# Patient Record
Sex: FEMALE | Race: OTHER | HISPANIC OR LATINO | URBAN - METROPOLITAN AREA
[De-identification: names, ages, dates, MRNs, and addresses within clinical notes are randomized per-mention and may not be internally consistent; named-entity substitution may affect disease eponyms.]

---

## 2019-07-12 PROBLEM — Z00.00 ENCOUNTER FOR PREVENTIVE HEALTH EXAMINATION: Status: ACTIVE | Noted: 2019-07-12

## 2019-07-16 ENCOUNTER — EMERGENCY (EMERGENCY)
Facility: HOSPITAL | Age: 44
LOS: 1 days | Discharge: HOME | End: 2019-07-16
Attending: EMERGENCY MEDICINE | Admitting: EMERGENCY MEDICINE
Payer: COMMERCIAL

## 2019-07-16 VITALS
HEIGHT: 62 IN | HEART RATE: 65 BPM | OXYGEN SATURATION: 100 % | DIASTOLIC BLOOD PRESSURE: 97 MMHG | SYSTOLIC BLOOD PRESSURE: 153 MMHG | TEMPERATURE: 98 F | RESPIRATION RATE: 16 BRPM | WEIGHT: 154.32 LBS

## 2019-07-16 VITALS
TEMPERATURE: 98 F | HEART RATE: 64 BPM | OXYGEN SATURATION: 99 % | RESPIRATION RATE: 16 BRPM | DIASTOLIC BLOOD PRESSURE: 83 MMHG | SYSTOLIC BLOOD PRESSURE: 123 MMHG

## 2019-07-16 DIAGNOSIS — Z90.710 ACQUIRED ABSENCE OF BOTH CERVIX AND UTERUS: Chronic | ICD-10-CM

## 2019-07-16 PROCEDURE — 99283 EMERGENCY DEPT VISIT LOW MDM: CPT

## 2019-07-16 RX ORDER — NYSTATIN CREAM 100000 [USP'U]/G
1 CREAM TOPICAL ONCE
Refills: 0 | Status: COMPLETED | OUTPATIENT
Start: 2019-07-16 | End: 2019-07-16

## 2019-07-16 RX ORDER — NYSTATIN CREAM 100000 [USP'U]/G
1 CREAM TOPICAL
Qty: 30 | Refills: 0
Start: 2019-07-16 | End: 2019-07-22

## 2019-07-16 RX ADMIN — NYSTATIN CREAM 1 APPLICATION(S): 100000 CREAM TOPICAL at 02:38

## 2019-07-16 NOTE — ED ADULT NURSE NOTE - NSIMPLEMENTINTERV_GEN_ALL_ED
Implemented All Universal Safety Interventions:  [place] to call system. Call bell, personal items and telephone within reach. Instruct patient to call for assistance. Room bathroom lighting operational. Non-slip footwear when patient is off stretcher. Physically safe environment: no spills, clutter or unnecessary equipment. Stretcher in lowest position, wheels locked, appropriate side rails in place.

## 2019-07-16 NOTE — ED PROVIDER NOTE - PHYSICAL EXAMINATION
exam with female rn chaperone exam with female rn chaperone (joann delatorre) present for entire exam

## 2019-07-16 NOTE — ED ADULT NURSE NOTE - OBJECTIVE STATEMENT
Patient came in to ED c/o itchy rasch on groin x 3days. Patient denies fever/ chills/ no burning on urination.

## 2019-07-16 NOTE — ED PROVIDER NOTE - OBJECTIVE STATEMENT
mouna simmons pt c/o itchy rash to groin x 3 days. no fevers, chills, pain, d/n/v, abd pain, vag bleed or d/c, dysuria, hematuria, freq.  pmd - iris

## 2019-07-16 NOTE — ED ADULT NURSE NOTE - FINAL NURSING ELECTRONIC SIGNATURE
----- Message from Jamie Lujan MD sent at 6/13/2017 11:03 AM CDT -----  There is some calcification of the shoulder joint complements likely causing the decreased mobility and pain patient to follow up with the orthopedics as recommended.   16-Jul-2019 03:09

## 2020-08-22 NOTE — ED PROVIDER NOTE - DISCHARGE DATE
Pt's HOB is currently at 60 degrees. Pt requested to leave it at 60 degrees to sleep. Will continue to monitor.   16-Jul-2019

## 2020-12-28 ENCOUNTER — TRANSCRIPTION ENCOUNTER (OUTPATIENT)
Age: 45
End: 2020-12-28

## 2022-01-08 ENCOUNTER — EMERGENCY (EMERGENCY)
Facility: HOSPITAL | Age: 47
LOS: 1 days | Discharge: HOME | End: 2022-01-08
Attending: EMERGENCY MEDICINE | Admitting: EMERGENCY MEDICINE
Payer: MEDICAID

## 2022-01-08 VITALS
HEART RATE: 75 BPM | OXYGEN SATURATION: 99 % | SYSTOLIC BLOOD PRESSURE: 145 MMHG | RESPIRATION RATE: 18 BRPM | TEMPERATURE: 97 F | HEIGHT: 62 IN | DIASTOLIC BLOOD PRESSURE: 88 MMHG

## 2022-01-08 VITALS
TEMPERATURE: 98 F | SYSTOLIC BLOOD PRESSURE: 127 MMHG | DIASTOLIC BLOOD PRESSURE: 76 MMHG | RESPIRATION RATE: 16 BRPM | OXYGEN SATURATION: 97 % | HEART RATE: 76 BPM

## 2022-01-08 DIAGNOSIS — Z90.710 ACQUIRED ABSENCE OF BOTH CERVIX AND UTERUS: Chronic | ICD-10-CM

## 2022-01-08 LAB
ALBUMIN SERPL ELPH-MCNC: 3.8 G/DL — SIGNIFICANT CHANGE UP (ref 3.3–5)
ALP SERPL-CCNC: 70 U/L — SIGNIFICANT CHANGE UP (ref 30–120)
ALT FLD-CCNC: 25 U/L DA — SIGNIFICANT CHANGE UP (ref 10–60)
ANION GAP SERPL CALC-SCNC: 9 MMOL/L — SIGNIFICANT CHANGE UP (ref 5–17)
AST SERPL-CCNC: 15 U/L — SIGNIFICANT CHANGE UP (ref 10–40)
BASOPHILS # BLD AUTO: 0.03 K/UL — SIGNIFICANT CHANGE UP (ref 0–0.2)
BASOPHILS NFR BLD AUTO: 0.5 % — SIGNIFICANT CHANGE UP (ref 0–2)
BILIRUB SERPL-MCNC: 0.4 MG/DL — SIGNIFICANT CHANGE UP (ref 0.2–1.2)
BUN SERPL-MCNC: 14 MG/DL — SIGNIFICANT CHANGE UP (ref 7–23)
CALCIUM SERPL-MCNC: 8.7 MG/DL — SIGNIFICANT CHANGE UP (ref 8.4–10.5)
CHLORIDE SERPL-SCNC: 104 MMOL/L — SIGNIFICANT CHANGE UP (ref 96–108)
CO2 SERPL-SCNC: 26 MMOL/L — SIGNIFICANT CHANGE UP (ref 22–31)
CREAT SERPL-MCNC: 0.62 MG/DL — SIGNIFICANT CHANGE UP (ref 0.5–1.3)
D DIMER BLD IA.RAPID-MCNC: <150 NG/ML DDU — SIGNIFICANT CHANGE UP
EOSINOPHIL # BLD AUTO: 0.08 K/UL — SIGNIFICANT CHANGE UP (ref 0–0.5)
EOSINOPHIL NFR BLD AUTO: 1.5 % — SIGNIFICANT CHANGE UP (ref 0–6)
GLUCOSE SERPL-MCNC: 106 MG/DL — HIGH (ref 70–99)
HCG UR QL: NEGATIVE — SIGNIFICANT CHANGE UP
HCT VFR BLD CALC: 36.5 % — SIGNIFICANT CHANGE UP (ref 34.5–45)
HGB BLD-MCNC: 12.7 G/DL — SIGNIFICANT CHANGE UP (ref 11.5–15.5)
IMM GRANULOCYTES NFR BLD AUTO: 0 % — SIGNIFICANT CHANGE UP (ref 0–1.5)
LYMPHOCYTES # BLD AUTO: 2.73 K/UL — SIGNIFICANT CHANGE UP (ref 1–3.3)
LYMPHOCYTES # BLD AUTO: 49.6 % — HIGH (ref 13–44)
MAGNESIUM SERPL-MCNC: 2.1 MG/DL — SIGNIFICANT CHANGE UP (ref 1.6–2.6)
MCHC RBC-ENTMCNC: 31 PG — SIGNIFICANT CHANGE UP (ref 27–34)
MCHC RBC-ENTMCNC: 34.8 GM/DL — SIGNIFICANT CHANGE UP (ref 32–36)
MCV RBC AUTO: 89 FL — SIGNIFICANT CHANGE UP (ref 80–100)
MONOCYTES # BLD AUTO: 0.34 K/UL — SIGNIFICANT CHANGE UP (ref 0–0.9)
MONOCYTES NFR BLD AUTO: 6.2 % — SIGNIFICANT CHANGE UP (ref 2–14)
NEUTROPHILS # BLD AUTO: 2.32 K/UL — SIGNIFICANT CHANGE UP (ref 1.8–7.4)
NEUTROPHILS NFR BLD AUTO: 42.2 % — LOW (ref 43–77)
NRBC # BLD: 0 /100 WBCS — SIGNIFICANT CHANGE UP (ref 0–0)
PLATELET # BLD AUTO: 194 K/UL — SIGNIFICANT CHANGE UP (ref 150–400)
POTASSIUM SERPL-MCNC: 3.7 MMOL/L — SIGNIFICANT CHANGE UP (ref 3.5–5.3)
POTASSIUM SERPL-SCNC: 3.7 MMOL/L — SIGNIFICANT CHANGE UP (ref 3.5–5.3)
PROT SERPL-MCNC: 7.3 G/DL — SIGNIFICANT CHANGE UP (ref 6–8.3)
RBC # BLD: 4.1 M/UL — SIGNIFICANT CHANGE UP (ref 3.8–5.2)
RBC # FLD: 11.6 % — SIGNIFICANT CHANGE UP (ref 10.3–14.5)
SODIUM SERPL-SCNC: 139 MMOL/L — SIGNIFICANT CHANGE UP (ref 135–145)
TROPONIN I, HIGH SENSITIVITY RESULT: 4.9 NG/L — SIGNIFICANT CHANGE UP
WBC # BLD: 5.5 K/UL — SIGNIFICANT CHANGE UP (ref 3.8–10.5)
WBC # FLD AUTO: 5.5 K/UL — SIGNIFICANT CHANGE UP (ref 3.8–10.5)

## 2022-01-08 PROCEDURE — 84484 ASSAY OF TROPONIN QUANT: CPT

## 2022-01-08 PROCEDURE — 81025 URINE PREGNANCY TEST: CPT

## 2022-01-08 PROCEDURE — 83735 ASSAY OF MAGNESIUM: CPT

## 2022-01-08 PROCEDURE — 99285 EMERGENCY DEPT VISIT HI MDM: CPT

## 2022-01-08 PROCEDURE — 71045 X-RAY EXAM CHEST 1 VIEW: CPT

## 2022-01-08 PROCEDURE — 85379 FIBRIN DEGRADATION QUANT: CPT

## 2022-01-08 PROCEDURE — 71045 X-RAY EXAM CHEST 1 VIEW: CPT | Mod: 26

## 2022-01-08 PROCEDURE — 96374 THER/PROPH/DIAG INJ IV PUSH: CPT

## 2022-01-08 PROCEDURE — 93010 ELECTROCARDIOGRAM REPORT: CPT

## 2022-01-08 PROCEDURE — 36415 COLL VENOUS BLD VENIPUNCTURE: CPT

## 2022-01-08 PROCEDURE — 99284 EMERGENCY DEPT VISIT MOD MDM: CPT | Mod: 25

## 2022-01-08 PROCEDURE — 80053 COMPREHEN METABOLIC PANEL: CPT

## 2022-01-08 PROCEDURE — 93005 ELECTROCARDIOGRAM TRACING: CPT

## 2022-01-08 PROCEDURE — 85025 COMPLETE CBC W/AUTO DIFF WBC: CPT

## 2022-01-08 RX ORDER — KETOROLAC TROMETHAMINE 30 MG/ML
30 SYRINGE (ML) INJECTION ONCE
Refills: 0 | Status: DISCONTINUED | OUTPATIENT
Start: 2022-01-08 | End: 2022-01-08

## 2022-01-08 RX ADMIN — Medication 30 MILLIGRAM(S): at 22:54

## 2022-01-08 RX ADMIN — Medication 30 MILLIGRAM(S): at 23:10

## 2022-01-08 NOTE — ED PROVIDER NOTE - OBJECTIVE STATEMENT
otherwise healthy 46 year old female presents with intermittent chest tightness and palpitations for past week. symptoms more so presents at night. no shortness of breath. symptoms started again about 30 min ago tonight. tightness very mild in nature. pain does not radiate. able to take a deep breath without difficulty. does reports increased stress recently. no leg pain or swelling. no history of PE or DVT.   PCP [place]

## 2022-01-08 NOTE — ED PROVIDER NOTE - CARE PROVIDER_API CALL
Selvin Foster (MD)  Cardiovascular Disease; Internal Medicine  [address], [address]  [place] [address]  Phone: [phone]  Fax: [phone]  Follow Up Time: 1-3 Days

## 2022-01-08 NOTE — ED PROVIDER NOTE - PROGRESS NOTE DETAILS
Reevaluated patient at bedside.  Patient feeling improved.  Discussed the results of all diagnostic testing in ED and copies of all reports given.   will follow up with cardiology.  referral provided. An opportunity to ask questions was given.  Discussed the importance of prompt, close medical follow-up.  Patient will return with any changes, concerns or persistent / worsening symptoms.  Understanding of all instructions verbalized.

## 2022-01-08 NOTE — ED PROVIDER NOTE - PATIENT PORTAL LINK FT
You can access the FollowMyHealth Patient Portal offered by [facility] by registering at the following website: [email or web address]. By joining [employer]’s FollowMyHealth portal, you will also be able to view your health information using other applications (apps) compatible with our system.

## 2022-01-08 NOTE — ED PROVIDER NOTE - NSFOLLOWUPCLINICS_GEN_ALL_ED_FT
[facility]  Cardiology  [address] [place] [address]  Phone: [phone]  Fax:   Follow Up Time: 1-3 Days

## 2022-01-08 NOTE — ED PROVIDER NOTE - CLINICAL SUMMARY MEDICAL DECISION MAKING FREE TEXT BOX
intermittent chest pain and palpitations past week, worse at night. differentials include but not limited to anxiety reaction, chest wall pain, ACS, pericarditis, myocarditis, PE. will check labs, EKG, CXR, consider CTA if d-dimer elevated. cardio follow up

## 2022-01-09 PROBLEM — Z78.9 OTHER SPECIFIED HEALTH STATUS: Chronic | Status: ACTIVE | Noted: 2019-07-16

## 2024-04-13 ENCOUNTER — EMERGENCY (EMERGENCY)
Facility: HOSPITAL | Age: 49
LOS: 1 days | Discharge: HOME | End: 2024-04-13
Attending: EMERGENCY MEDICINE | Admitting: EMERGENCY MEDICINE
Payer: MEDICAID

## 2024-04-13 VITALS
TEMPERATURE: 98 F | WEIGHT: 149.03 LBS | HEIGHT: 62 IN | HEART RATE: 68 BPM | OXYGEN SATURATION: 100 % | DIASTOLIC BLOOD PRESSURE: 92 MMHG | SYSTOLIC BLOOD PRESSURE: 152 MMHG | RESPIRATION RATE: 16 BRPM

## 2024-04-13 VITALS
DIASTOLIC BLOOD PRESSURE: 88 MMHG | RESPIRATION RATE: 16 BRPM | HEART RATE: 70 BPM | OXYGEN SATURATION: 99 % | SYSTOLIC BLOOD PRESSURE: 136 MMHG

## 2024-04-13 DIAGNOSIS — Z90.710 ACQUIRED ABSENCE OF BOTH CERVIX AND UTERUS: Chronic | ICD-10-CM

## 2024-04-13 LAB
ALBUMIN SERPL ELPH-MCNC: 4 G/DL — SIGNIFICANT CHANGE UP (ref 3.3–5)
ALP SERPL-CCNC: 75 U/L — SIGNIFICANT CHANGE UP (ref 30–120)
ALT FLD-CCNC: 51 U/L — SIGNIFICANT CHANGE UP (ref 10–60)
ANION GAP SERPL CALC-SCNC: 7 MMOL/L — SIGNIFICANT CHANGE UP (ref 5–17)
APTT BLD: 32 SEC — SIGNIFICANT CHANGE UP (ref 24.5–35.6)
AST SERPL-CCNC: 25 U/L — SIGNIFICANT CHANGE UP (ref 10–40)
BASOPHILS # BLD AUTO: 0.02 K/UL — SIGNIFICANT CHANGE UP (ref 0–0.2)
BASOPHILS NFR BLD AUTO: 0.4 % — SIGNIFICANT CHANGE UP (ref 0–2)
BILIRUB SERPL-MCNC: 0.4 MG/DL — SIGNIFICANT CHANGE UP (ref 0.2–1.2)
BUN SERPL-MCNC: 12 MG/DL — SIGNIFICANT CHANGE UP (ref 7–23)
CALCIUM SERPL-MCNC: 9.3 MG/DL — SIGNIFICANT CHANGE UP (ref 8.4–10.5)
CHLORIDE SERPL-SCNC: 107 MMOL/L — SIGNIFICANT CHANGE UP (ref 96–108)
CO2 SERPL-SCNC: 27 MMOL/L — SIGNIFICANT CHANGE UP (ref 22–31)
CREAT SERPL-MCNC: 0.63 MG/DL — SIGNIFICANT CHANGE UP (ref 0.5–1.3)
EGFR: 109 ML/MIN/1.73M2 — SIGNIFICANT CHANGE UP
EOSINOPHIL # BLD AUTO: 0.06 K/UL — SIGNIFICANT CHANGE UP (ref 0–0.5)
EOSINOPHIL NFR BLD AUTO: 1.2 % — SIGNIFICANT CHANGE UP (ref 0–6)
GLUCOSE SERPL-MCNC: 95 MG/DL — SIGNIFICANT CHANGE UP (ref 70–99)
HCT VFR BLD CALC: 37.6 % — SIGNIFICANT CHANGE UP (ref 34.5–45)
HGB BLD-MCNC: 12.9 G/DL — SIGNIFICANT CHANGE UP (ref 11.5–15.5)
IMM GRANULOCYTES NFR BLD AUTO: 0.2 % — SIGNIFICANT CHANGE UP (ref 0–0.9)
INR BLD: 0.88 RATIO — SIGNIFICANT CHANGE UP (ref 0.85–1.18)
LYMPHOCYTES # BLD AUTO: 2.08 K/UL — SIGNIFICANT CHANGE UP (ref 1–3.3)
LYMPHOCYTES # BLD AUTO: 40.9 % — SIGNIFICANT CHANGE UP (ref 13–44)
MCHC RBC-ENTMCNC: 30.8 PG — SIGNIFICANT CHANGE UP (ref 27–34)
MCHC RBC-ENTMCNC: 34.3 GM/DL — SIGNIFICANT CHANGE UP (ref 32–36)
MCV RBC AUTO: 89.7 FL — SIGNIFICANT CHANGE UP (ref 80–100)
MONOCYTES # BLD AUTO: 0.3 K/UL — SIGNIFICANT CHANGE UP (ref 0–0.9)
MONOCYTES NFR BLD AUTO: 5.9 % — SIGNIFICANT CHANGE UP (ref 2–14)
NEUTROPHILS # BLD AUTO: 2.62 K/UL — SIGNIFICANT CHANGE UP (ref 1.8–7.4)
NEUTROPHILS NFR BLD AUTO: 51.4 % — SIGNIFICANT CHANGE UP (ref 43–77)
NRBC # BLD: 0 /100 WBCS — SIGNIFICANT CHANGE UP (ref 0–0)
PLATELET # BLD AUTO: 211 K/UL — SIGNIFICANT CHANGE UP (ref 150–400)
POTASSIUM SERPL-MCNC: 4 MMOL/L — SIGNIFICANT CHANGE UP (ref 3.5–5.3)
POTASSIUM SERPL-SCNC: 4 MMOL/L — SIGNIFICANT CHANGE UP (ref 3.5–5.3)
PROT SERPL-MCNC: 7.4 G/DL — SIGNIFICANT CHANGE UP (ref 6–8.3)
PROTHROM AB SERPL-ACNC: 9.6 SEC — SIGNIFICANT CHANGE UP (ref 9.5–13)
RBC # BLD: 4.19 M/UL — SIGNIFICANT CHANGE UP (ref 3.8–5.2)
RBC # FLD: 12 % — SIGNIFICANT CHANGE UP (ref 10.3–14.5)
SODIUM SERPL-SCNC: 141 MMOL/L — SIGNIFICANT CHANGE UP (ref 135–145)
TROPONIN I, HIGH SENSITIVITY RESULT: <4 NG/L — SIGNIFICANT CHANGE UP
WBC # BLD: 5.09 K/UL — SIGNIFICANT CHANGE UP (ref 3.8–10.5)
WBC # FLD AUTO: 5.09 K/UL — SIGNIFICANT CHANGE UP (ref 3.8–10.5)

## 2024-04-13 PROCEDURE — 36415 COLL VENOUS BLD VENIPUNCTURE: CPT

## 2024-04-13 PROCEDURE — 93010 ELECTROCARDIOGRAM REPORT: CPT

## 2024-04-13 PROCEDURE — 70496 CT ANGIOGRAPHY HEAD: CPT | Mod: MC

## 2024-04-13 PROCEDURE — 85730 THROMBOPLASTIN TIME PARTIAL: CPT

## 2024-04-13 PROCEDURE — 99285 EMERGENCY DEPT VISIT HI MDM: CPT | Mod: 25

## 2024-04-13 PROCEDURE — 70496 CT ANGIOGRAPHY HEAD: CPT | Mod: 26,MC

## 2024-04-13 PROCEDURE — 84484 ASSAY OF TROPONIN QUANT: CPT

## 2024-04-13 PROCEDURE — 70450 CT HEAD/BRAIN W/O DYE: CPT | Mod: 26,59,MC

## 2024-04-13 PROCEDURE — 85025 COMPLETE CBC W/AUTO DIFF WBC: CPT

## 2024-04-13 PROCEDURE — 70498 CT ANGIOGRAPHY NECK: CPT | Mod: MC

## 2024-04-13 PROCEDURE — 85610 PROTHROMBIN TIME: CPT

## 2024-04-13 PROCEDURE — 99285 EMERGENCY DEPT VISIT HI MDM: CPT

## 2024-04-13 PROCEDURE — 70450 CT HEAD/BRAIN W/O DYE: CPT | Mod: MC

## 2024-04-13 PROCEDURE — 80053 COMPREHEN METABOLIC PANEL: CPT

## 2024-04-13 PROCEDURE — 93005 ELECTROCARDIOGRAM TRACING: CPT

## 2024-04-13 PROCEDURE — 70498 CT ANGIOGRAPHY NECK: CPT | Mod: 26,MC

## 2024-04-13 RX ORDER — SODIUM CHLORIDE 9 MG/ML
1000 INJECTION INTRAMUSCULAR; INTRAVENOUS; SUBCUTANEOUS ONCE
Refills: 0 | Status: COMPLETED | OUTPATIENT
Start: 2024-04-13 | End: 2024-04-13

## 2024-04-13 RX ORDER — MECLIZINE HCL 12.5 MG
25 TABLET ORAL ONCE
Refills: 0 | Status: COMPLETED | OUTPATIENT
Start: 2024-04-13 | End: 2024-04-13

## 2024-04-13 RX ORDER — MECLIZINE HCL 12.5 MG
1 TABLET ORAL
Qty: 15 | Refills: 0
Start: 2024-04-13 | End: 2024-04-17

## 2024-04-13 RX ADMIN — Medication 25 MILLIGRAM(S): at 18:44

## 2024-04-13 RX ADMIN — SODIUM CHLORIDE 1000 MILLILITER(S): 9 INJECTION INTRAMUSCULAR; INTRAVENOUS; SUBCUTANEOUS at 18:44

## 2024-04-13 NOTE — ED ADULT NURSE NOTE - NSFALLUNIVINTERV_ED_ALL_ED
Bed/Stretcher in lowest position, wheels locked, appropriate side rails in place/Call bell, personal items and telephone in reach/Instruct patient to call for assistance before getting out of bed/chair/stretcher/Non-slip footwear applied when patient is off stretcher/[place] to call system/Physically safe environment - no spills, clutter or unnecessary equipment/Purposeful proactive rounding/Room/bathroom lighting operational, light cord in reach

## 2024-04-13 NOTE — ED PROVIDER NOTE - NSFOLLOWUPINSTRUCTIONS_ED_ALL_ED_FT
Follow-up with neurologist for reevaluation, ongoing care and treatment. Follow up with your ENT. Take meclizine as prescribed.  Rest, stay hydrated.  If having worsening symptoms or other related symptoms, return to the ER immediately.    Dizziness  Dizziness is a common problem. It makes you feel unsteady or light-headed. You may feel like you are about to pass out (faint). Dizziness can lead to getting hurt if you stumble or fall. Dizziness can be caused by many things, including:  Medicines.  Not having enough water in your body (dehydration).  Illness.  Follow these instructions at home:  Eating and drinking    A comparison of three sample cups showing dark yellow, yellow, and pale yellow urine.  Drink enough fluid to keep your pee (urine) pale yellow. This helps to keep you from getting dehydrated. Try to drink more clear fluids, such as water.  Do not drink alcohol.  Limit how much caffeine you drink or eat, if your doctor tells you to do that.  Limit how much salt (sodium) you drink or eat, if your doctor tells you to do that.  Activity    A sign showing that a person should not drive.  Avoid making quick movements.  Stand up slowly from sitting in a chair, and steady yourself until you feel okay.  In the morning, first sit up on the side of the bed. When you feel okay, stand up slowly while you hold onto something. Do this until you know that your balance is okay.  If you need to  one place for a long time, move your legs often. Tighten and relax the muscles in your legs while you are standing.  Do not drive or use machinery if you feel dizzy.  Avoid bending down if you feel dizzy. Place items in your home so you can reach them easily without leaning over.  Lifestyle    Do not smoke or use any products that contain nicotine or tobacco. If you need help quitting, ask your doctor.  Try to lower your stress level. You can do this by using methods such as yoga or meditation. Talk with your doctor if you need help.  General instructions    Watch your dizziness for any changes.  Take over-the-counter and prescription medicines only as told by your doctor. Talk with your doctor if you think that you are dizzy because of a medicine that you are taking.  Tell a friend or a family member that you are feeling dizzy. If he or she notices any changes in your behavior, have this person call your doctor.  Keep all follow-up visits.  Contact a doctor if:  Your dizziness does not go away.  Your dizziness or light-headedness gets worse.  You feel like you may vomit (are nauseous).  You have trouble hearing.  You have new symptoms.  You are unsteady on your feet.  You feel like the room is spinning.  You have neck pain or a stiff neck.  You have a fever.  Get help right away if:  You vomit or have watery poop (diarrhea), and you cannot eat or drink anything.  You have trouble:  Talking.  Walking.  Swallowing.  Using your arms, hands, or legs.  You feel generally weak.  You are not thinking clearly, or you have trouble forming sentences. A friend or family member may notice this.  You have:  Chest pain.  Pain in your belly (abdomen).  Shortness of breath.  Sweating.  Your vision changes.  You are bleeding.  You have a very bad headache.  These symptoms may be an emergency. Get help right away. Call your local emergency services (911 in the [place]  Do not wait to see if the symptoms will go away.  Do not drive yourself to the hospital.  Summary  Dizziness makes you feel unsteady or light-headed. You may feel like you are about to pass out (faint).  Drink enough fluid to keep your pee (urine) pale yellow. Do not drink alcohol.  Avoid making quick movements if you feel dizzy.  Watch your dizziness for any changes.

## 2024-04-13 NOTE — ED PROVIDER NOTE - CARE PROVIDER_API CALL
Kalin Cutler  Neurology  [address] [place] [address]  Phone: [phone]  Fax: [phone]  Follow Up Time: 1-3 Days    YOUR PCP,   Phone: (   )    -  Fax: (   )    -  Follow Up Time: 1-3 Days    YOUR ENT,   Phone: (   )    -  Fax: (   )    -  Follow Up Time: 1-3 Days

## 2024-04-13 NOTE — ED PROVIDER NOTE - PROVIDER TOKENS
PROVIDER:[TOKEN:[[id number]],FOLLOWUP:[1-3 Days]],FREE:[LAST:[YOUR PCP],PHONE:[(   )    -],FAX:[(   )    -],FOLLOWUP:[1-3 Days]],FREE:[LAST:[YOUR ENT],PHONE:[(   )    -],FAX:[(   )    -],FOLLOWUP:[1-3 Days]]

## 2024-04-13 NOTE — ED PROVIDER NOTE - PATIENT PORTAL LINK FT
You can access the FollowMyHealth Patient Portal offered by [facility] by registering at the following website: [email or web address]. By joining [employer]’s FollowMyHealth portal, you will also be able to view your health information using other applications (apps) compatible with our system.

## 2024-04-13 NOTE — ED PROVIDER NOTE - RATE
Patient called wanted the status of her refill request. Patient would like it done asap because the pharmacy closes early tomorrow.   60

## 2024-04-13 NOTE — ED PROVIDER NOTE - PROGRESS NOTE DETAILS
Reevaluated patient at bedside.  Patient feeling much improved.  Discussed the results of all diagnostic testing in ED and copies of all reports given. Advised will rx meclizine and f/u with neuro, f/u with ent. An opportunity to ask questions was given.  Discussed the importance of prompt, close medical follow-up.  Patient will return with any changes, concerns or persistent / worsening symptoms.  Understanding of all instructions verbalized.

## 2024-04-13 NOTE — ED PROVIDER NOTE - CLINICAL SUMMARY MEDICAL DECISION MAKING FREE TEXT BOX
48-year-old female with history of hyperlipidemia (diet-controlled) presents with complaint of dizziness x 1 week.  States that she has dizziness described as feeling off balance while walking for the past 1 week which has worsened over the past few days and improved with rest.  States that she had vertigo last year.  Since that time, was prescribed meclizine and has regular ENT appointments, next appt on 4/29.  States that this feels different.  Denies headache, trauma, vision changes, numbness, tingling, focal weakness, speech changes, photophobia, chest pain, shortness of breath, nausea, vomiting, fever, abdominal pain or other symptoms.    VSS Afebrile, NAD  HEENT - clear  PERRL EOMI, There is no nystagmus.  Neck supple  lungs clear  Cor S1S2 RR - MGR  Abd soft nontender, no mass or HSM, no rebound  Ext FROM intact, no edema  Neuro A&O x 3.  Cranial nerves II through XII intact.  There are no sensorimotor deficits.  DTRs +2 throughout.  Gait is normal.  Negative Romberg.  Babinski is negative bilaterally.  Speech is clear.  Skin Warm and dry no rash.    Impression is dizziness.  Plan is we will get CT CTA labs EKG and trial of meclizine.  May need neuro evaluation versus outpatient follow-up.

## 2024-04-13 NOTE — ED PROVIDER NOTE - OBJECTIVE STATEMENT
48-year-old female with history of hyperlipidemia (diet-controlled) presents with complaint of dizziness x 1 week.  States that she has dizziness described as feeling off balance while walking for the past 1 week which has worsened over the past few days and improved with rest.  States that she had vertigo last year.  Since that time, was prescribed meclizine and has regular ENT appointments, next appt on 4/29.  States that this feels different.  Denies headache, trauma, vision changes, numbness, tingling, focal weakness, speech changes, photophobia, chest pain, shortness of breath, nausea, vomiting, fever, abdominal pain or other symptoms.

## 2024-06-18 NOTE — ED PROVIDER NOTE - NEUROLOGICAL LEVEL OF CONSCIOUSNESS
OFFICE VISIT      Patient: Bacilio Rush   : 1974 MRN: [id number]    SUBJECTIVE:  Chief Complaint   Patient presents with    Diabetes    Hypertension    Follow-up       A 50 year old female presents for a follow-up.     The recording was initiated after a verbal consent was obtained from the patient to record this visit for documentation in their clinical record.    HISTORY OF PRESENT ILLNESS:  Wears mask, practices social distancing, has not been sick recently and no exposure to any Covid-19 positive case.    Last visit was in 2024.  Type 2 diabetes mellitus with other specified complication, without long-term current use of insulin (CMD):  HbA1c was 12 % in 2024 and 7.7 % in May 2024.   On Trulicity 3 mg weekly and Glipizide 10 mg daily. Does not have refills for the past one week as 3 mg dose is in back order. Discontinued insulin.    Anxiety/Major depressive disorder, recurrent episode, moderate  (CMD):  On Xanax 1 mg as needed for anxiety and Paroxetine 40 mg daily, Cymbalta 60 mg daily, and Depakote 250 mg twice daily which helps in stabilizing her mood.  Requests refills.   Her depression was exacerbated by a recent death in your family    Osteoarthritis of spine with radiculopathy, lumbar region:  On Gabapentin 600 mg three times daily. Requests refills. Has arthralgia. On Meloxicam 15 mg daily with benefits.     Primary hypertension:  Blood pressure is normal today.   On Amlodipine 5 mg daily, Losartan 50 mg daily, Spironolactone 25 mg daily, Furosemide 40 mg twice daily, and Carvedilol 6.25 mg twice daily.     Chronic diastolic heart failure (CMD)/Coronary artery disease involving native coronary artery of native heart without angina pectoris:  Her potassium level was 4.3 checked previously.   Has undergone six stent placement. Saw Dr. Dickerson in 2024 and will follow up in six months. On Rosuvastatin 40 mg daily, Ranexa 500 mg twice daily, and Aspirin 81 mg daily.     Additional  comment:   Her weight has been stable since the last visit. States she has gained weight and was never overweight before.  Reports fatigue as she has been busy.   Has sinus infection and has been wearing mask.   Mentions her legs were swollen yesterday.   Has frequent urination.   She fell on the side of the ankle.     PAST MEDICAL HISTORY:  Past Medical History:   Diagnosis Date    Anxiety     Arthritis     CAD S/P percutaneous coronary angioplasty     Chronic pain     Complete tear of left rotator cuff 1/21/2019    DDD (degenerative disc disease), lumbar     Depression     Diabetes mellitus  (CMD)     Essential (primary) hypertension     Obesity      MEDICATIONS:  Current Outpatient Medications   Medication Sig    dulaglutide (TRULICITY) 1.5 MG/0.5ML pen-injector Inject 1.5 mg into the skin every 7 days. Indications: Type 2 Diabetes    ALPRAZolam (XANAX) 1 MG tablet Take 1 tablet by mouth 3 times daily as needed for Anxiety.    divalproex (DEPAKOTE) 250 MG delayed release EC tablet Take 1 tablet by mouth in the morning and 1 tablet in the evening.    gabapentin (NEURONTIN) 600 MG tablet Take 1 tablet by mouth in the morning and 1 tablet at noon and 1 tablet in the evening.    meclizine (ANTIVERT) 25 MG tablet TAKE 1 TABLET BY MOUTH THREE TIMES DAILY AS NEEDED FOR DIZZINESS    rosuvastatin (CRESTOR) 40 MG tablet TAKE 1 TABLET BY MOUTH DAILY.    losartan (COZAAR) 50 MG tablet TAKE 1 TABLET BY MOUTH DAILY AT BEDTIME    ranolazine (RANEXA) 500 MG 12 hr tablet TAKE 1 TABLET BY MOUTH IN THE MORNING AND 1 TABLET IN THE EVENING.    meloxicam (MOBIC) 15 MG tablet TAKE 1 TABLET BY MOUTH DAILY WITH BREAKFAST    DULoxetine (CYMBALTA) 60 MG capsule TAKE 1 CAPSULE BY MOUTH DAILY    Trulicity 3 MG/0.5ML pen-injector INJECT 3 MG SUBCUTANEOUSLY EVERY 7 DAYS *INDICATIONS:TYPE 2 DIABETES*    metaxalone (SKELAXIN) 800 MG tablet Take 1 tablet by mouth in the morning and 1 tablet at noon and 1 tablet in the evening.    pantoprazole  (PROTONIX) 40 MG tablet TAKE 1 TABLET BY MOUTH TWICE DAILY    ezetimibe (ZETIA) 10 MG tablet Take 1 tablet by mouth daily.    Blood Glucose Monitoring Suppl (Blood Glucose Monitor System) w/Device Kit Check blood daily and prn    Lancets 30G Misc daily.    Alcohol Swabs (Alcohol Prep) 70 % Pads Use as needed to check blood sugars    blood glucose (OneTouch Ultra) test strip 2 times daily. Test blood sugar2 times daily.    furosemide (LASIX) 40 MG tablet Take 1 tablet by mouth 2 times daily.    clopidogrel (PLAVIX) 75 MG tablet TAKE 1 TABLET BY MOUTH DAILY    guaiFENesin-DM (ROBITUSSIN DM) 100-10 MG/5ML syrup Take 10 mLs by mouth every 4 hours as needed for Cough.    Insulin Pen Needle 31G X 4 MM Misc 4 times daily (before meals and nightly). Use to inject insulin 4 times daily. Remove needle cover(s) to expose needle before injecting.    glipiZIDE (GLUCOTROL XL) 10 MG 24 hr tablet Take 1 tablet by mouth daily. Do not start before December 18, 2023.    albuterol (ACCUNEB) 0.63 MG/3ML nebulizer solution Take 3 mLs by nebulization every 6 hours as needed for Wheezing or Shortness of Breath.    albuterol 108 (90 Base) MCG/ACT inhaler Inhale 2 puffs into the lungs every 4 hours as needed for Shortness of Breath or Wheezing.    fluticasone-salmeterol 100-50 MCG/ACT inhaler Inhale 1 puff into the lungs in the morning and 1 puff in the evening.    acetaminophen (TYLENOL) 650 MG CR tablet Take 1,300 mg by mouth every morning.    diphenhydramine-acetaminophen (Tylenol PM Extra Strength)  MG Tab Take 2 tablets by mouth nightly.    Menthol, Topical Analgesic, (BIOFREEZE EX) Use as directed as needed for pain. Apply to shoulder(s) and knees    PARoxetine (PAXIL) 40 MG tablet Take 1 tablet by mouth every morning.    potassium chloride (KLOR-CON) 10 MEQ ER tablet TAKE 1 TABLET BY MOUTH TWICE DAILY    amLODIPine (NORVASC) 5 MG tablet TAKE 1 TABLET BY MOUTH DAILY.    carvedilol (COREG) 6.25 MG tablet TAKE 1 TABLET BY MOUTH  DAILY IN THE MORNING AND TAKE 1 TABLET IN THE EVENING WITH MEALS    ondansetron (ZOFRAN ODT) 4 MG disintegrating tablet DISSOLVE 1 TABLET ON TONGUE EVERY 8 HOURS AS NEEDED FOR NAUSEA    spironolactone (ALDACTONE) 25 MG tablet TAKE 1 TABLET BY MOUTH DAILY.    hydrOXYzine (ATARAX) 25 MG tablet Take 1 tablet by mouth every 8 hours as needed for Anxiety.    benzonatate (TESSALON PERLES) 100 MG capsule Take 1 capsule by mouth 3 times daily as needed for Cough.    naLOXone (NARCAN) 4 MG/0.1ML nasal spray Call 911. [place]aine the content of 1 device into 1 nostril. May repeat with 2nd device in alternate nostril if no response in 2-3 minutes.    aspirin 81 MG tablet Take 1 tablet by mouth daily.    DISPENSE Glucose test strips for New Diabetic Pt testing 1 times per day     No current facility-administered medications for this visit.     ALLERGIES:  ALLERGIES:   Allergen Reactions    Latex   (Environmental) Other (See Comments)    Morphine HIVES     PAST SURGICAL HISTORY:  Past Surgical History:   Procedure Laterality Date    Cdl coronary stent      rca     section, classic      Hysterectomy       FAMILY HISTORY:  Family History   Problem Relation Age of Onset    Hypertension Mother     Diabetes Mother     Hypertension Father     Hypertension Sister     Cancer Sister     Cancer Brother     Hypertension Brother     Hypertension Daughter     Diabetes Daughter     Diabetes Maternal Aunt      SOCIAL HISTORY:  Social History     Tobacco Use   Smoking Status Former    Types: Cigarettes   Smokeless Tobacco Never     Social History     Substance and Sexual Activity   Alcohol Use Not Currently       Review of Systems  Constitutional: Negative for chills, fever, and fatigue.  HENT: Positive for sinus pressure and sinus pain. Negative for congestion, rhinorrhea, and sore throat.   Respiratory: Negative for shortness of breath, cough, and wheezing.  Cardiovascular: Positive for leg swelling. Negative for chest pain, and  palpitations.  Gastrointestinal: Negative for abdominal pain, diarrhea, constipation, nausea, and vomiting.  Genitourinary: Positive for increased frequency. Negative for difficulty urinating, burning in urination, and urgency.  Musculoskeletal: Positive for arthralgia. Negative for joint swelling, back pain, neck pain, and neck stiffness.  Neurological: Negative for dizziness, light-headedness, numbness, and headaches.  Psychiatric/Behavioral: Positive for depression. Negative for anxiety.  All other systems reviewed and are negative.    OBJECTIVE:  Vitals:    06/17/24 1711   BP: 119/71   BP Location: LUE - Left upper extremity   Patient Position: Sitting   Cuff Size: Large Adult   Pulse: 99   Resp: 18   Temp: 98.5 °F (36.9 °C)   SpO2: 98%   Weight: 112.5 kg (248 lb 2 oz)   Height: 5' 10\"       Physical Exam  Constitutional: Alert, in no acute distress.  Eyes: No discharge, normal conjunctiva, no eyelid swelling, no ptosis, and the sclerae were normal. Pupils equal, round and reactive to light and accommodation, conjugate gaze, and extraocular movements were intact.  ENT: Normal appearing outer ear, normal appearing nose. Examination of the tympanic membrane showed normal landmarks, normal appearing external canal. Nasal mucosa moist and pink, no nasal discharge. Normal lips. Oral mucosa pink and moist, no oral lesions.  Neck: Normal appearing, supple neck, and no mass was seen. Thyroid not enlarged and no thyroid nodules.  No lymphadenopathy.  Pulmonary: No respiratory distress, normal respiratory rate and effort and no accessory muscle use. Breath sounds clear to auscultation bilaterally.  Cardiovascular: Normal rate, no murmurs, regular rhythm, normal S1, and normal S2. Trace edema.  Abdomen: Soft, non-tender, non-distended, normal bowel sounds, and no abdominal mass. No hepatomegaly and no splenomegaly. No umbilical hernia was seen.  Musculoskeletal: Normal gait. No musculoskeletal erythema was seen, no joint  swelling seen, and no joint tenderness was elicited. No scoliosis. Normal range of motion. There was no joint instability noted. Muscle strength and tone were normal. Crepitus in knees.  Neurological: Cranial nerves II-XII are grossly intact. Reflexes are normal. No sensory changes.  Psychiatric: Oriented to person, place, and time. Interactive and mood/affect were appropriate. Judgment not impaired. Normal attention span. Short term memory intact.  Skin, Hair, Nails: Normal skin color and pigmentation and no rash. No foot and skin ulcers were seen. Normal skin turgor. No clubbing or cyanosis of the fingernails. She has a subcutaneous nodule or hematoma on her right lower extremity.     DIAGNOSTIC STUDIES:  LAB RESULTS:  Lab Services on 05/31/2024   Component Date Value Ref Range Status    Hemoglobin A1C 05/31/2024 7.7 (H)  4.5 - 5.6 % Final    Microalbumin, Urine 05/31/2024 0.81  mg/dL Final    Creatinine, Urine 05/31/2024 13.90  mg/dL Final    Microalbumin/ Creatinine Ratio 05/31/2024 58.3 (H)  <30.0 mg/g Final       ASSESSMENT AND PLAN:  This is a 50 year old female who presents with :  1. Type 2 diabetes mellitus with other specified complication, without long-term current use of insulin  (CMD)    2. Anxiety    3. Major depressive disorder, recurrent episode, moderate  (CMD)    4. Osteoarthritis of spine with radiculopathy, lumbar region    5. Primary hypertension    6. Chronic diastolic heart failure  (CMD)    7. Coronary artery disease involving native coronary artery of native heart without angina pectoris        Orders Placed This Encounter    dulaglutide (TRULICITY) 1.5 MG/0.5ML pen-injector    ALPRAZolam (XANAX) 1 MG tablet    divalproex (DEPAKOTE) 250 MG delayed release EC tablet    gabapentin (NEURONTIN) 600 MG tablet       Plan:  Type 2 diabetes mellitus with other specified complication, without long-term current use of insulin  (CMD):  Moderately controlled.   Reviewed and discussed the relevant  reports.   Prescribed Dulaglutide (TRULICITY) 1.5 MG/0.5ML pen-injector; Inject 1.5 mg into the skin every 7 days. Indications: Type 2 Diabetes Dispense: 2 mL; Refill: 1.  Advised to continue Glipizide 10 mg daily.  Discussed will increase the dose of Trulicity to 4.5 mg once 3 mg dose is available after one month.     Anxiety/Major depressive disorder, recurrent episode, moderate  (CMD):  Continue Paroxetine 40 mg daily and Cymbalta 60 mg daily  Refills provided for Alprazolam (XANAX) 1 MG tablet; Take 1 tablet by mouth 3 times daily as needed for Anxiety.  Dispense: 90 tablet; Refill: 3.  Refills provided for divalproex (DEPAKOTE) 250 MG delayed release EC tablet; Take 1 tablet by mouth in the morning and 1 tablet in the evening.  Dispense: 180 tablet; Refill: 3.    Osteoarthritis of spine with radiculopathy, lumbar region:  Continue current management.  Refills provided for gabapentin (NEURONTIN) 600 MG tablet; Take 1 tablet by mouth in the morning and 1 tablet at noon and 1 tablet in the evening. Dispense: 270 tablet; Refill: 0    Primary hypertension:  Well controlled.  Continue Amlodipine 5 mg daily, Losartan 50 mg daily, Spironolactone 25 mg daily, Furosemide 40 mg twice daily, and Carvedilol 6.25 mg twice daily.   Recommended salt restricted diet.  Advised to monitor blood pressure at home and log it.     Chronic diastolic heart failure (CMD)/Coronary artery disease involving native coronary artery of native heart without angina pectoris:  Reviewed and discussed previous labs.  Continue Rosuvastatin 40 mg daily, Ranexa 500 mg twice daily, and Aspirin 81 mg daily.   Follow up with Dr. Dickerson.    Additional comment:   Recommended heat application on the hematoma on her right lower extremity.   Recommended doing labs on 08/31/2024.    Follow up in October 2024 for Medicare wellness visit.     Educated on the precautionary measures for prevention of Covid-19.    Refer to orders.  Medical compliance with plan  discussed and risks of non-compliance reviewed.  Patient education completed on disease process, etiology & prognosis.  Proper usage and side effects of medications reviewed & discussed.  Patient understands and agrees with the plan.  Return to clinic as clinically indicated as discussed with patient who verbalized understanding of the plan and is in agreement with the plan.    ITereza, have created a visit summary document based on the audio recording between Dr. Brionna Dhillon MD and this patient for the physician to review, edit as needed, and authenticate.  Creation Date: 6/17/2024 I have reviewed and edited the visit summary above and attest that it is accurate.       alert/follows commands

## 2025-05-11 ENCOUNTER — EMERGENCY (EMERGENCY)
Facility: HOSPITAL | Age: 50
LOS: 1 days | End: 2025-05-11
Attending: EMERGENCY MEDICINE | Admitting: EMERGENCY MEDICINE
Payer: COMMERCIAL

## 2025-05-11 VITALS
TEMPERATURE: 98 F | DIASTOLIC BLOOD PRESSURE: 68 MMHG | OXYGEN SATURATION: 97 % | SYSTOLIC BLOOD PRESSURE: 104 MMHG | HEART RATE: 87 BPM | RESPIRATION RATE: 16 BRPM

## 2025-05-11 VITALS
HEIGHT: 62 IN | TEMPERATURE: 98 F | WEIGHT: 147.93 LBS | RESPIRATION RATE: 15 BRPM | SYSTOLIC BLOOD PRESSURE: 129 MMHG | DIASTOLIC BLOOD PRESSURE: 80 MMHG | HEART RATE: 94 BPM | OXYGEN SATURATION: 99 %

## 2025-05-11 DIAGNOSIS — Z90.710 ACQUIRED ABSENCE OF BOTH CERVIX AND UTERUS: Chronic | ICD-10-CM

## 2025-05-11 LAB
ALBUMIN SERPL ELPH-MCNC: 4.8 G/DL — SIGNIFICANT CHANGE UP (ref 3.3–5)
ALP SERPL-CCNC: 90 U/L — SIGNIFICANT CHANGE UP (ref 30–120)
ALT FLD-CCNC: 55 U/L — SIGNIFICANT CHANGE UP (ref 10–60)
ANION GAP SERPL CALC-SCNC: 12 MMOL/L — SIGNIFICANT CHANGE UP (ref 5–17)
AST SERPL-CCNC: 28 U/L — SIGNIFICANT CHANGE UP (ref 10–40)
BASOPHILS # BLD AUTO: 0.02 K/UL — SIGNIFICANT CHANGE UP (ref 0–0.2)
BASOPHILS NFR BLD AUTO: 0.2 % — SIGNIFICANT CHANGE UP (ref 0–2)
BILIRUB SERPL-MCNC: 0.6 MG/DL — SIGNIFICANT CHANGE UP (ref 0.2–1.2)
BUN SERPL-MCNC: 23 MG/DL — SIGNIFICANT CHANGE UP (ref 7–23)
CALCIUM SERPL-MCNC: 9.7 MG/DL — SIGNIFICANT CHANGE UP (ref 8.4–10.5)
CHLORIDE SERPL-SCNC: 105 MMOL/L — SIGNIFICANT CHANGE UP (ref 96–108)
CO2 SERPL-SCNC: 26 MMOL/L — SIGNIFICANT CHANGE UP (ref 22–31)
CREAT SERPL-MCNC: 0.89 MG/DL — SIGNIFICANT CHANGE UP (ref 0.5–1.3)
EGFR: 79 ML/MIN/1.73M2 — SIGNIFICANT CHANGE UP
EGFR: 79 ML/MIN/1.73M2 — SIGNIFICANT CHANGE UP
EOSINOPHIL # BLD AUTO: 0 K/UL — SIGNIFICANT CHANGE UP (ref 0–0.5)
EOSINOPHIL NFR BLD AUTO: 0 % — SIGNIFICANT CHANGE UP (ref 0–6)
GLUCOSE SERPL-MCNC: 127 MG/DL — HIGH (ref 70–99)
HCT VFR BLD CALC: 43.3 % — SIGNIFICANT CHANGE UP (ref 34.5–45)
HGB BLD-MCNC: 15 G/DL — SIGNIFICANT CHANGE UP (ref 11.5–15.5)
IMM GRANULOCYTES NFR BLD AUTO: 0.2 % — SIGNIFICANT CHANGE UP (ref 0–0.9)
LIDOCAIN IGE QN: 88 U/L — HIGH (ref 16–77)
LYMPHOCYTES # BLD AUTO: 0.33 K/UL — LOW (ref 1–3.3)
LYMPHOCYTES # BLD AUTO: 3.6 % — LOW (ref 13–44)
MAGNESIUM SERPL-MCNC: 2.3 MG/DL — SIGNIFICANT CHANGE UP (ref 1.6–2.6)
MCHC RBC-ENTMCNC: 31.4 PG — SIGNIFICANT CHANGE UP (ref 27–34)
MCHC RBC-ENTMCNC: 34.6 G/DL — SIGNIFICANT CHANGE UP (ref 32–36)
MCV RBC AUTO: 90.8 FL — SIGNIFICANT CHANGE UP (ref 80–100)
MONOCYTES # BLD AUTO: 0.28 K/UL — SIGNIFICANT CHANGE UP (ref 0–0.9)
MONOCYTES NFR BLD AUTO: 3 % — SIGNIFICANT CHANGE UP (ref 2–14)
NEUTROPHILS # BLD AUTO: 8.55 K/UL — HIGH (ref 1.8–7.4)
NEUTROPHILS NFR BLD AUTO: 93 % — HIGH (ref 43–77)
NRBC BLD AUTO-RTO: 0 /100 WBCS — SIGNIFICANT CHANGE UP (ref 0–0)
PLATELET # BLD AUTO: 220 K/UL — SIGNIFICANT CHANGE UP (ref 150–400)
POTASSIUM SERPL-MCNC: 3.9 MMOL/L — SIGNIFICANT CHANGE UP (ref 3.5–5.3)
POTASSIUM SERPL-SCNC: 3.9 MMOL/L — SIGNIFICANT CHANGE UP (ref 3.5–5.3)
PROT SERPL-MCNC: 8.9 G/DL — HIGH (ref 6–8.3)
RBC # BLD: 4.77 M/UL — SIGNIFICANT CHANGE UP (ref 3.8–5.2)
RBC # FLD: 12.1 % — SIGNIFICANT CHANGE UP (ref 10.3–14.5)
SODIUM SERPL-SCNC: 143 MMOL/L — SIGNIFICANT CHANGE UP (ref 135–145)
TROPONIN I, HIGH SENSITIVITY RESULT: <4 NG/L — SIGNIFICANT CHANGE UP
TROPONIN I, HIGH SENSITIVITY RESULT: <4 NG/L — SIGNIFICANT CHANGE UP
WBC # BLD: 9.2 K/UL — SIGNIFICANT CHANGE UP (ref 3.8–10.5)
WBC # FLD AUTO: 9.2 K/UL — SIGNIFICANT CHANGE UP (ref 3.8–10.5)

## 2025-05-11 PROCEDURE — 36415 COLL VENOUS BLD VENIPUNCTURE: CPT

## 2025-05-11 PROCEDURE — 93010 ELECTROCARDIOGRAM REPORT: CPT | Mod: 76

## 2025-05-11 PROCEDURE — 76830 TRANSVAGINAL US NON-OB: CPT | Mod: 26

## 2025-05-11 PROCEDURE — 70450 CT HEAD/BRAIN W/O DYE: CPT

## 2025-05-11 PROCEDURE — 74177 CT ABD & PELVIS W/CONTRAST: CPT

## 2025-05-11 PROCEDURE — 83690 ASSAY OF LIPASE: CPT

## 2025-05-11 PROCEDURE — 76830 TRANSVAGINAL US NON-OB: CPT

## 2025-05-11 PROCEDURE — 80053 COMPREHEN METABOLIC PANEL: CPT

## 2025-05-11 PROCEDURE — 93005 ELECTROCARDIOGRAM TRACING: CPT

## 2025-05-11 PROCEDURE — 99285 EMERGENCY DEPT VISIT HI MDM: CPT

## 2025-05-11 PROCEDURE — 84484 ASSAY OF TROPONIN QUANT: CPT

## 2025-05-11 PROCEDURE — 85025 COMPLETE CBC W/AUTO DIFF WBC: CPT

## 2025-05-11 PROCEDURE — 99285 EMERGENCY DEPT VISIT HI MDM: CPT | Mod: 25

## 2025-05-11 PROCEDURE — 96365 THER/PROPH/DIAG IV INF INIT: CPT | Mod: XU

## 2025-05-11 PROCEDURE — 70450 CT HEAD/BRAIN W/O DYE: CPT | Mod: 26

## 2025-05-11 PROCEDURE — 83735 ASSAY OF MAGNESIUM: CPT

## 2025-05-11 PROCEDURE — 74177 CT ABD & PELVIS W/CONTRAST: CPT | Mod: 26

## 2025-05-11 PROCEDURE — 96375 TX/PRO/DX INJ NEW DRUG ADDON: CPT

## 2025-05-11 RX ORDER — ONDANSETRON HCL/PF 4 MG/2 ML
4 VIAL (ML) INJECTION ONCE
Refills: 0 | Status: COMPLETED | OUTPATIENT
Start: 2025-05-11 | End: 2025-05-11

## 2025-05-11 RX ORDER — ONDANSETRON HCL/PF 4 MG/2 ML
1 VIAL (ML) INJECTION
Qty: 15 | Refills: 0
Start: 2025-05-11 | End: 2025-05-15

## 2025-05-11 RX ORDER — ACETAMINOPHEN 500 MG/5ML
1000 LIQUID (ML) ORAL ONCE
Refills: 0 | Status: COMPLETED | OUTPATIENT
Start: 2025-05-11 | End: 2025-05-11

## 2025-05-11 RX ADMIN — Medication 1000 MILLIGRAM(S): at 12:30

## 2025-05-11 RX ADMIN — Medication 1000 MILLIGRAM(S): at 12:31

## 2025-05-11 RX ADMIN — Medication 40 MILLIGRAM(S): at 12:10

## 2025-05-11 RX ADMIN — Medication 4 MILLIGRAM(S): at 12:10

## 2025-05-11 RX ADMIN — Medication 400 MILLIGRAM(S): at 12:10

## 2025-05-11 RX ADMIN — Medication 1000 MILLILITER(S): at 12:10

## 2025-05-11 NOTE — ED ADULT NURSE REASSESSMENT NOTE - NS ED NURSE REASSESS COMMENT FT1
nausea and pain gone. diarrhea x 1 in ed per pt. pt re evaluated by md and to be d'c/d  iv d'c/d  no s/s infiltration upon removal  pt discharged stable and ambulatory in nad at present d/c instruction reinforced and pt verbalized understanding vital signs as charted

## 2025-05-11 NOTE — ED PROVIDER NOTE - DIFFERENTIAL DIAGNOSIS
Patient presenting to the emergency room with a chief complaint of nausea vomiting diarrhea and a syncopal episode.  Differential includes but not limited to viral gastroenteritis versus possible gallbladder pathology versus additional acute intra-abdominal pathology.  Terms of syncope this is likely vasovagal must rule out cardiac pathology.  Will obtain screening labs cardiac enzymes x 2 EKG CT imaging of the head and abdomen and monitor.  Ultimate clinical disposition will be pending results. Differential Diagnosis

## 2025-05-11 NOTE — ED PROVIDER NOTE - CLINICAL SUMMARY MEDICAL DECISION MAKING FREE TEXT BOX
Patient is a 49-year-old female who presents to the emergency room with a chief complaint of nausea vomiting.  Past medical history of vertigo.  Patient has been experiencing nausea vomiting diarrhea since last night.  Reports this morning at 4 AM she became lightheaded had a syncopal episode and fall head injury with several minutes of loss of consciousness but she is not on any anticoagulation again she felt lightheaded prior to this.  No prior syncopal episodes.  Denies any fevers chest pain or shortness of breath.  She reports she is been unable to tolerate p.o.  Reports upper abdominal discomfort.  No prior similar issues. Patient presenting to the emergency room with a chief complaint of nausea vomiting diarrhea and a syncopal episode.  Differential includes but not limited to viral gastroenteritis versus possible gallbladder pathology versus additional acute intra-abdominal pathology.  Terms of syncope this is likely vasovagal must rule out cardiac pathology.  Will obtain screening labs cardiac enzymes x 2 EKG CT imaging of the head and abdomen and monitor.  Ultimate clinical disposition will be pending results. Results of labs reviewed patient with a normal white count no evidence of anemia cardiac enzymes negative x 2 CMP noted lipase marginally elevated no tenderness over the pancreas.  Independent review of CT imaging of the brain reveals no acute intracranial hemorrhage mild sinus disease independent review of CT imaging of the abdomen pelvis reveals fatty liver prominent cervix will order pelvic ultrasound fluid-filled descending transverse colon without evidence of colonic wall thickening likely viral in nature.  Independent review of transvaginal ultrasound reveals a normal cervix possible uterine adenomyosis.  Independent review of EKG reveals a normal sinus rhythm at 80 bpm independent review repeat EKG reveals a normal sinus rhythm at 82 bpm. Copy of results provided all abnormal results reviewed

## 2025-05-11 NOTE — ED PROVIDER NOTE - CARE PROVIDER_API CALL
Tony Keating  Gastroenterology  [address] [place] [address]  Phone: [phone]  Fax: [phone]  Follow Up Time:     Elisa Maxwell  Obstetrics and Gynecology  [address] [place] [id number]  Phone: [phone]  Fax: [phone]  Follow Up Time:

## 2025-05-11 NOTE — ED PROVIDER NOTE - NSFOLLOWUPINSTRUCTIONS_ED_ALL_ED_FT
Return to the emergency room for any new or worsening symptoms  Take your medication as prescribed  Clear liquids advance as tolerated  Zofran per label instructions as needed for nausea  Protonix 1 tab daily  Follow-up with the gastroenterologist call tomorrow to schedule follow-up  Follow-up with the gynecologist if you do not have a physician a referral was provided below  Advance activity as tolerated         Acute Nausea and Vomiting    WHAT YOU NEED TO KNOW:    What does acute mean? Acute means the nausea and vomiting starts suddenly, gets worse quickly, and lasts a short time.    What are some common causes of acute nausea and vomiting?    Food poisoning    Large amounts of alcohol    Certain medicines, too much of any medicine, or stopping a regular medicine too quickly    Early stages of pregnancy    Infection in the stomach, intestines, or other organs    Trauma to the head    Anxiety or stress    Gastroparesis (a condition that prevents your stomach from emptying properly)    Metabolic disorders, such as uremia or adrenal insufficiency  What causes acute nausea and vomiting with other signs and symptoms? You may have stomach pain or problems with digestion. You may be sweating, have pale skin, and more saliva than usual. These signs and symptoms may be caused by the following:    Problems with your heart rate, blood flow to your heart muscle, blood pressure, or stomach fluid    Increased pressure or bleeding in the brain    Swelling of the tissue covering the brain    Migraine or seizures    Inner ear disorders that cause problems with balance    Inflammation of the appendix, gallbladder, stomach, pancreas, kidneys, or other organs    Bacteria or a parasite in the digestive system    Heart attack    Stomach ulcers, or bowel blockage or twisting  How is the cause of acute nausea and vomiting diagnosed? Your healthcare provider will examine you and ask about your symptoms. Tell him or her if the vomiting was before, during, or after a meal. Your provider may ask what medicines you take, including over-the-counter medicines. You may need blood tests to check for infection or inflammation.    How is acute nausea and vomiting treated? Vomiting may go away on its own. The goal of treatment is to prevent dehydration. Treatment also depends on the cause of the nausea and vomiting. Any medical condition causing your nausea and vomiting will also be treated. You may need one or more of the following:    Medicines may be given to calm your stomach and stop your vomiting. You may also need medicines to help empty your stomach and bowels.    IV fluids may be given to replace lost fluids and electrolytes. This may be needed it you cannot drink liquids.    A nasogastric (NG) tube may be needed if your nausea and vomiting is severe. The NG tube is put into your nose and moved down your throat until it reaches your stomach. Liquid, nutrition, or medicine may be given through an NG tube. The tube may instead be attached to suction if healthcare providers need to keep your stomach empty.  What can I do to manage acute nausea and vomiting?    Rest as much as you can. Too much activity can make your nausea worse.    Drink more liquids to prevent dehydration. Take small sips. Try drinks such as ginger ale, lemonade, water, or tea. Your provider may recommend that you drink an oral rehydration solution (ORS). ORS contains water, salts, and sugar that are needed to replace the lost body fluids.    Eat smaller meals, more often. Try bland foods and avoid spices or strong flavors    Do not drink alcohol. Alcohol may upset or irritate your stomach.  Call your local emergency number (911 in the [place]) if:    You have chest pain.    You have severe pain or cramping in your abdomen.    Your vision is blurred.    You are confused, have a high fever, or a stiff neck.    You have bright red blood coming from your rectum.    Your vomit smells like bowel movement.  When should I seek immediate care?    You have a severe headache or pain.    You are dizzy, cold, and thirsty, and your eyes and mouth are dry.    You are urinating very little or not at all.    You are dizzy or lightheaded when you stand up.    You see blood or material that looks like coffee grounds in your vomit.  When should I call my doctor?    You continue to vomit for more than 48 hours.    Your nausea and vomiting does not get better or go away after you use medicine.    You have questions or concerns about your condition or care.  CARE AGREEMENT:    You have the right to help plan your care. Learn about your health condition and how it may be treated. Discuss treatment options with your healthcare providers to decide what care you want to receive. You always have the right to refuse treatment

## 2025-05-11 NOTE — ED PROVIDER NOTE - OBJECTIVE STATEMENT
Patient is a 49-year-old female who presents to the emergency room with a chief complaint of nausea vomiting.  Past medical history of vertigo.  Patient has been experiencing nausea vomiting diarrhea since last night.  Reports this morning at 4 AM she became lightheaded had a syncopal episode and fall head injury with several minutes of loss of consciousness but she is not on any anticoagulation again she felt lightheaded prior to this.  No prior syncopal episodes.  Denies any fevers chest pain or shortness of breath.  She reports she is been unable to tolerate p.o.  Reports upper abdominal discomfort.  No prior similar issues.

## 2025-05-11 NOTE — ED PROVIDER NOTE - PATIENT PORTAL LINK FT
You can access the FollowMyHealth Patient Portal offered by [facility] by registering at the following website: [email or web address]. By joining [employer]’s FollowMyHealth portal, you will also be able to view your health information using other applications (apps) compatible with our system.

## 2025-05-11 NOTE — ED ADULT TRIAGE NOTE - CHIEF COMPLAINT QUOTE
pt states that she has had n/v/d since last night at 22:00. Pt states that she had a syncopal episode this morning at 04:00. Pt denies hitting her head and blood thinners. Pt states that she feels upper abdominal pain, weak, cold sweats and nauseous.

## 2025-05-11 NOTE — ED ADULT NURSE NOTE - OBJECTIVE STATEMENT
fainted at 400 am due to lightheadedness. h/o vertigo. c/o nausea and vomiting and diarrhea since last night. denies chest pain. denies sob. fell last night with fainting but denies hitting head. perrl. pt aaox3 skin warm and dry no resp distress lungs clear and equal b/l ascultation abd soft  tender to upper abd and epigastric region + bs